# Patient Record
Sex: FEMALE | Race: WHITE | HISPANIC OR LATINO | Employment: FULL TIME | URBAN - METROPOLITAN AREA
[De-identification: names, ages, dates, MRNs, and addresses within clinical notes are randomized per-mention and may not be internally consistent; named-entity substitution may affect disease eponyms.]

---

## 2023-07-16 ENCOUNTER — OFFICE VISIT (OUTPATIENT)
Dept: URGENT CARE | Facility: CLINIC | Age: 38
End: 2023-07-16
Payer: COMMERCIAL

## 2023-07-16 VITALS
DIASTOLIC BLOOD PRESSURE: 85 MMHG | HEART RATE: 114 BPM | OXYGEN SATURATION: 97 % | HEIGHT: 65 IN | RESPIRATION RATE: 20 BRPM | BODY MASS INDEX: 34.99 KG/M2 | SYSTOLIC BLOOD PRESSURE: 131 MMHG | TEMPERATURE: 102 F | WEIGHT: 210 LBS

## 2023-07-16 DIAGNOSIS — J06.9 URI, ACUTE: ICD-10-CM

## 2023-07-16 DIAGNOSIS — R50.9 FEVER, UNSPECIFIED FEVER CAUSE: Primary | ICD-10-CM

## 2023-07-16 DIAGNOSIS — B34.9 VIRAL SYNDROME: ICD-10-CM

## 2023-07-16 DIAGNOSIS — U07.1 LAB TEST POSITIVE FOR DETECTION OF COVID-19 VIRUS: ICD-10-CM

## 2023-07-16 LAB
CTP QC/QA: YES
CTP QC/QA: YES
POC MOLECULAR INFLUENZA A AGN: NEGATIVE
POC MOLECULAR INFLUENZA B AGN: NEGATIVE
SARS-COV-2 AG RESP QL IA.RAPID: POSITIVE

## 2023-07-16 PROCEDURE — 87811 SARS CORONAVIRUS 2 ANTIGEN POCT, MANUAL READ: ICD-10-PCS | Mod: QW,TIER,S$GLB, | Performed by: FAMILY MEDICINE

## 2023-07-16 PROCEDURE — 99214 OFFICE O/P EST MOD 30 MIN: CPT | Mod: TIER,S$GLB,, | Performed by: FAMILY MEDICINE

## 2023-07-16 PROCEDURE — 87502 POCT INFLUENZA A/B MOLECULAR: ICD-10-PCS | Mod: QW,TIER,S$GLB, | Performed by: FAMILY MEDICINE

## 2023-07-16 PROCEDURE — 99214 PR OFFICE/OUTPT VISIT, EST, LEVL IV, 30-39 MIN: ICD-10-PCS | Mod: TIER,S$GLB,, | Performed by: FAMILY MEDICINE

## 2023-07-16 PROCEDURE — 87502 INFLUENZA DNA AMP PROBE: CPT | Mod: QW,TIER,S$GLB, | Performed by: FAMILY MEDICINE

## 2023-07-16 PROCEDURE — 87811 SARS-COV-2 COVID19 W/OPTIC: CPT | Mod: QW,TIER,S$GLB, | Performed by: FAMILY MEDICINE

## 2023-07-16 RX ORDER — LORATADINE 10 MG/1
10 TABLET ORAL DAILY
Qty: 30 TABLET | Refills: 2 | Status: SHIPPED | OUTPATIENT
Start: 2023-07-16 | End: 2024-07-15

## 2023-07-16 RX ORDER — BENZONATATE 100 MG/1
200 CAPSULE ORAL 3 TIMES DAILY PRN
Qty: 30 CAPSULE | Refills: 1 | Status: SHIPPED | OUTPATIENT
Start: 2023-07-16

## 2023-07-16 NOTE — PATIENT INSTRUCTIONS
Your test was POSITIVE for COVID-19 (coronavirus).       Please isolate yourself at home.  You may leave home and/or return to work once the following conditions are met:    If you were not hospitalized and are not moderately to severely immunocompromised:   More than 5 days since symptoms first appeared AND  More than 24 hours fever free without medications AND  Symptoms are improving  Continue to wear a mask around others for 5 additional days.    If you were hospitalized OR are moderately to severely immunocompromised:  More than 20 days since symptoms first appeared  More than 24 hours fever free without medications  Symptoms have improved    If you had no symptoms but tested positive:  More than 5 days since the date of the first positive test (20 days if moderately to severely immunocompromised). If you develop symptoms, then use the guidelines above.  Continue to wear a mask around others for 5 additional days.      Contact Tracing    As one of the next steps, you will receive a call or text from the [place] Department of Health ([facility]) COVID-19 Tracing Team. See the contact information below so you know not to ignore the health departments call. It is important that you contact them back immediately so they can help.      Contact Tracer Number:  [phone]  Caller ID for most carriers: [facility]     What is contact tracing?  Contact tracing is a process that helps identify everyone who has been in close contact with an infected person. Contact tracers let those people know they may have been exposed and guide them on next steps. Confidentiality is important for everyone; no one will be told who may have exposed them to the virus.  Your involvement is important. The more we know about where and how this virus is spreading, the better chance we have at stopping it from spreading further.  What does exposure mean?  Exposure means you have been within 6 feet for more than 15 minutes with a person who  has or had COVID-19.  What kind of questions do the contact tracers ask?  A contact tracer will confirm your basic contact information including name, address, phone number, and next of kin, as well as asking about any symptoms you may have had. Theyll also ask you how you think you may have gotten sick, such as places where you may have been exposed to the virus, and people you were with. Those names will never be shared with anyone outside of that call, and will only be used to help trace and stop the spread of the virus.   I have privacy concerns. How will the state use my information?  Your privacy about your health is important. All calls are completed using call centers that use the appropriate health privacy protection measures (HIPAA compliance), meaning that your patient information is safe. No one will ever ask you any questions related to immigration status. Your health comes first.   Do I have to participate?  You do not have to participate, but we strongly encourage you to. Contact tracing can help us catch and control new outbreaks as theyre developing to keep your friends and family safe.   What if I dont hear from anyone?  If you dont receive a call within 24 hours, you can call the number above right away to inquire about your status. That line is open from 8:00 am - 8:00 p.m., 7 days a week.  Contact tracing saves lives! Together, we have the power to beat this virus and keep our loved ones and neighbors safe.    For more information see CDC link below.      https://www.cdc.gov/coronavirus/2019-ncov/hcp/guidance-prevent-spread.html#precautions        Sources:  [facility], [place] [facility] and [facility]           Sincerely,     Tiburcio Sarah MD

## 2023-07-16 NOTE — PROGRESS NOTES
"Subjective:      Patient ID: Nely Glover is a 37 y.o. female.    Vitals:  height is 5' 5" (1.651 m) and weight is 95.3 kg (210 lb). Her temperature is 101.6 °F (38.7 °C) (abnormal). Her blood pressure is 131/85 and her pulse is 114 (abnormal). Her respiration is 20 and oxygen saturation is 97%.     Chief Complaint: Sinus Problem    37 year old female presents today with fever, chills, sweats, weakness, fatigue, sinuses, sinus pressure, nasal congestion, body aches, post nasal drip. No known exposure to anything. Never had COVID before that she knows of. COVID vaccinated. Symptoms started 07/15/2023. Treatments at home include Mucinex and Tylenol Sinus with no relief.     Sinus Problem  This is a new problem. The current episode started in the past 7 days. The problem has been gradually worsening since onset. The maximum temperature recorded prior to her arrival was 101 - 101.9 F. Her pain is at a severity of 6/10. Associated symptoms include chills, diaphoresis, headaches, sinus pressure and sneezing. Past treatments include oral decongestants.     Constitution: Positive for chills and sweating.   HENT:  Positive for sinus pressure.    Allergic/Immunologic: Positive for sneezing.   Neurological:  Positive for headaches.    Objective:     Physical Exam   Constitutional: She is oriented to person, place, and time. She appears well-developed. She is cooperative.   HENT:   Head: Normocephalic and atraumatic.   Ears:   Right Ear: Hearing, tympanic membrane, external ear and ear canal normal.   Left Ear: Hearing, tympanic membrane, external ear and ear canal normal.   Nose: Nose normal. No mucosal edema or nasal deformity. No epistaxis. Right sinus exhibits no maxillary sinus tenderness and no frontal sinus tenderness. Left sinus exhibits no maxillary sinus tenderness and no frontal sinus tenderness.   Mouth/Throat: Uvula is midline, oropharynx is clear and moist and mucous membranes are normal. Mucous membranes are " moist. No trismus in the jaw. Normal dentition. No uvula swelling. Oropharynx is clear.   Eyes: Conjunctivae and lids are normal. Pupils are equal, round, and reactive to light. Extraocular movement intact   Neck: Trachea normal and phonation normal. Neck supple.   Cardiovascular: Normal rate, regular rhythm, normal heart sounds and normal pulses.   Pulmonary/Chest: Effort normal and breath sounds normal.   Abdominal: Normal appearance and bowel sounds are normal. Soft.   Musculoskeletal: Normal range of motion.         General: Normal range of motion.   Neurological: She is alert and oriented to person, place, and time. She exhibits normal muscle tone.   Skin: Skin is warm, dry and intact.   Psychiatric: Her speech is normal and behavior is normal. Judgment and thought content normal.   Nursing note and vitals reviewed.    Assessment:     1. Fever, unspecified fever cause    2. URI, acute    3. Viral syndrome        Plan:       Fever, unspecified fever cause  -     SARS Coronavirus 2 Antigen, POCT Manual Read  -     POCT Influenza A/B MOLECULAR    URI, acute    Viral syndrome            Results for orders placed or performed in visit on 07/16/23   SARS Coronavirus 2 Antigen, POCT Manual Read   Result Value Ref Range    SARS Coronavirus 2 Antigen Positive (A) Negative     Acceptable Yes             Pt advised to gargle with warm salt water q 4 hours  Tylenol alternate with Ibuprofen 2 po q 6 hours prn  Clraitin one daily    Pt advised quarantine for 5 days from day of exposue

## 2023-07-16 NOTE — LETTER
July 16, 2023      [facility] [place]  [address]  [place] [address]  Phone: [phone]  Fax: [phone]       Patient: Nely Glover   YOB: 1985  Date of Visit: 07/16/2023    To Whom It May Concern:    Susham Glover  was at [facility] on 07/16/2023. The patient may return to work/school on 7/ 20/23  With no  restrictions. If you have any questions or concerns, or if I can be of further assistance, please do not hesitate to contact me.    Sincerely,    Tiburcio Sarah MD

## 2023-09-29 ENCOUNTER — HOSPITAL ENCOUNTER (EMERGENCY)
Facility: HOSPITAL | Age: 38
Discharge: HOME | End: 2023-09-29
Attending: EMERGENCY MEDICINE
Payer: COMMERCIAL

## 2023-09-29 VITALS
OXYGEN SATURATION: 100 % | DIASTOLIC BLOOD PRESSURE: 82 MMHG | BODY MASS INDEX: 34.32 KG/M2 | TEMPERATURE: 98 F | HEIGHT: 65 IN | WEIGHT: 206 LBS | RESPIRATION RATE: 18 BRPM | SYSTOLIC BLOOD PRESSURE: 137 MMHG | HEART RATE: 95 BPM

## 2023-09-29 DIAGNOSIS — N92.6 IRREGULAR MENSTRUAL BLEEDING: Primary | ICD-10-CM

## 2023-09-29 LAB
B-HCG UR QL: NEGATIVE
CTP QC/QA: YES

## 2023-09-29 PROCEDURE — 81025 URINE PREGNANCY TEST: CPT | Performed by: EMERGENCY MEDICINE

## 2023-09-29 PROCEDURE — 99282 EMERGENCY DEPT VISIT SF MDM: CPT

## 2023-09-29 NOTE — ED PROVIDER NOTES
Chief Complaint:  Irregular vaginal bleeding    History of Present Illness:    Nely Glover 38 y.o. with a  has no past medical history on file. who presents to the emergency department today with a complaint of irregular vaginal bleeding.  Patient reports that she had her menstrual cycle on September 9th, it lasted 3 days which is slightly shorter than usual.  She took a plan B on the 18th.  Today she had an episode of vaginal bleeding just prior to coming in.  Described as red in color.  Denies any abdominal pain.  Only had 1 episode of bleeding.      ROS    Constitutional: No fever, no chills.  ENT: No nasal drainage. No ear ache. No sore throat.  Cardiovascular: No chest pain, no palpitations.  Respiratory: No cough, no shortness of breath.  Gastrointestinal: No abdominal pain, no vomiting. No diarrhea.  Genitourinary: No hematuria, dysuria, urgency.  Musculoskeletal: No back pain.   Neurological: No headache, no focal weakness.    Otherwise remaining ROS negative     The history is provided by the patient      Reviewed and verified by myself:   PMH/PSH/SOC/FH REVIEWED :    History reviewed. No pertinent past medical history.    History reviewed. No pertinent surgical history.    Social History     Socioeconomic History    Marital status: Single   Tobacco Use    Smoking status: Never   Substance and Sexual Activity    Alcohol use: Yes     Alcohol/week: 0.0 standard drinks of alcohol     Comment: socially    Drug use: No    Sexual activity: Yes     Partners: Male     Birth control/protection: OCP       History reviewed. No pertinent family history.        ALLERGIES REVIEWED  Review of patient's allergies indicates:  No Known Allergies    MEDICATIONS REVIEWED  Medication List with Changes/Refills   Current Medications    BENZONATATE (TESSALON PERLES) 100 MG CAPSULE    Take 2 capsules (200 mg total) by mouth 3 (three) times daily as needed for Cough.    DROSPIR-ETH ESTRA-LEVOMEFOL CA 3-0.02-0.451 MG (24) TAB     Take by mouth once daily.      DROSPIRENONE-ETHINYL ESTRADIOL (JANNET) 3-20 MG-MCG PER TABLET    Take 1 tablet by mouth once daily.      HUMAN PAPILOMVIRUS VAC,QVAL/PF (GARDASIL IM)    Inject into the muscle. 1st inj now. 2nd inj 2 months after the 1st. 3rd in 6 months after the 1st     LORATADINE (CLARITIN) 10 MG TABLET    Take 1 tablet (10 mg total) by mouth once daily.    MISOPROSTOL (CYTOTEC) 200 MCG TABLET    Take 200 mcg by mouth 4 (four) times daily.      NAPROXEN SODIUM (ANAPROX) 550 MG TABLET    Take 550 mg by mouth 2 (two) times daily with meals.      PNV NO.22-IRON CBN&GLUC-FA-DSS 27-1-50 MG TAB    Take by mouth once daily.             VS reviewed    Nursing/Ancillary staff note reviewed.       Physical Exam     ED Triage Vitals [09/29/23 1552]   /82   Pulse 95   Resp 18   Temp 98.1 °F (36.7 °C)   SpO2 100 %       Physical Exam    Constitutional: The patient is alert, has no immediate need for airway protection and no signs of toxicity. No acute distress. Lying in bed but able to sit up without difficulty.   Eyes: Pupils equal and round no pallor or injection. Extra ocular movements intact. No drainage.   ENT: Mucous membranes are moist. Oropharynx clear.   Neck: Neck is supple non-tender. No lymphadenopathy. No stridor.   Respiratory: There are no retractions, lungs are clear to auscultation. No wheezing, no crackles.   Cardiovascular: Regular rate and rhythm. No murmurs, rubs or gallops.  Chest/Breast: Chest wall nontender to palpation.   Gastrointestinal:  Abdomen is soft and non-tender, no masses, bowel sounds normal. No guarding, no rebound.  No pulsatile mass.   Neurological: Alert and oriented x 4. CN II-XII grossly intact. No focal weakness. Strength intact 5/5 bilaterally in upper and lower extremities.   Skin: Warm and dry, no rashes.  Musculoskeletal: Extremities are non-tender, non-swollen and have full range of motion. Back NTTP along the midline.   Psyc: Normal behavior. Linear thought  content.          ED Course         ED Course as of 09/30/23 1904   Fri Sep 29, 2023   1707 Preg Test, Ur: Negative [JA]      ED Course User Index  [JA] Jairo Gutiérrez MD          ED Management:            Medical Decision Making  Differential diagnosis included but not limited to :  Irregular menstrual cycle, miscarriage, pregnancy, implantation bleeding    Initial: This is a 38 y.o. female  with has no past medical history on file who comes in for emergent evaluation of a new, acute, complicated and undiagnosed problem of 1 episode of vaginal spotting. On examination vitals are stable.  Physical examination is normal.    Orders I ordered to further evaluate included: UPT    Comorbidity impacting this encounter includes:    Social determinants of health taken into consideration during development of our treatment plan include   Body mass index is 34.28 kg/m². - Cumulative social disadvantage, denoted by higher SDOH burden, was associated with increased odds of obesity, independent of clinical and demographic factors. PMID: 69821774 DOI: 10.1002/yoselin.21558      Problems Addressed:  Irregular menstrual bleeding: complicated acute illness or injury    Amount and/or Complexity of Data Reviewed  External Data Reviewed: notes.     Details: Patient was seen 07/16/2023 by urgent care for fever, diagnosed with COVID URI  Labs: ordered. Decision-making details documented in ED Course.    Risk  OTC drugs.        OTC products such as tylenol or ibuprofen discussed for supplemental symptom control.         MDM continued:     Nely Glover  presents to the emergency Department today with an episode of vaginal spotting., UPT negative.  She is not pregnant or having a miscarriage.  I have discussed with her to follow up with her OBGYN for continued care management.  Vital signs stable, she has no need for any further interventions today.        The pt is comfortable with this plan and comfortable going home at this time. After  taking into careful account the historical factors and physical exam findings of the patient's presentation today, in conjunction with the empirical and objective data obtained on ED workup, no acute emergent medical condition requiring admission has been identified. The patient appears to be low risk for an emergent medical condition and I feel it is safe and appropriate at this time for the patient to be discharged to follow-up as detailed in their discharge instructions for reevaluation and possible continued outpatient workup and management. Regardless, an unremarkable evaluation in the ED does not preclude the development or presence of a serious or life threatening condition. As such, patient was instructed to return immediately for any worsening or change in current symptoms. Precautions for return discussed at length.  Discharge and follow-up instructions discussed with the patient who expressed understanding and willingness to comply with my recommendations.    Voice recognition software utilized in this note.      Procedures          Impression      The encounter diagnosis was Irregular menstrual bleeding.        Discharge Medication List as of 9/29/2023  5:29 PM           Follow-up Information       Schedule an appointment as soon as possible for a visit  with Your OB/GYN.    Why: Keep your appointment as scheduled.                                  Jairo Gutiérrez MD  09/30/23 1909

## 2023-09-29 NOTE — DISCHARGE INSTRUCTIONS
Please follow up with your OB/GYN for your abnormal bleeding. Take your medications as prescribed return to the Emergency Department if you have nonstop vomiting, unable to tolerate liquids, lightheaded, dizzy, are not producing urine, abdominal pain or any other concerns. Refer to the additional material provided for further information.

## 2023-09-29 NOTE — ED TRIAGE NOTES
"Pt arrived with c/o vaginal bleeding, using 1 pad/hr for the past 2.5 hours ago.  LMP 9/9, states she took Plan B 9/18.  Pt denies any blood clots.  Pt reports "pressure on her nipples over the past few days," but denies any other pain.  Denies any SOB, weakness, or dizziness.  Pt answering questions appropriately, speaking in complete sentences, respirations even and unlabored.  Aao x 4.    "